# Patient Record
Sex: MALE | Race: WHITE | URBAN - METROPOLITAN AREA
[De-identification: names, ages, dates, MRNs, and addresses within clinical notes are randomized per-mention and may not be internally consistent; named-entity substitution may affect disease eponyms.]

---

## 2019-10-07 ENCOUNTER — OFFICE VISIT (OUTPATIENT)
Dept: URBAN - METROPOLITAN AREA CLINIC 71 | Facility: CLINIC | Age: 74
End: 2019-10-07
Payer: MEDICARE

## 2019-10-07 DIAGNOSIS — H43.811 VITREOUS DEGENERATION, RIGHT EYE: Primary | ICD-10-CM

## 2019-10-07 DIAGNOSIS — H10.45 OTHER CHRONIC ALLERGIC CONJUNCTIVITIS: ICD-10-CM

## 2019-10-07 DIAGNOSIS — H25.13 NUCLEAR SCLEROSIS CATARACT, BILATERAL: ICD-10-CM

## 2019-10-07 DIAGNOSIS — H04.123 DRY EYE SYNDROME OF BILATERAL LACRIMAL GLANDS: ICD-10-CM

## 2019-10-07 PROCEDURE — 99204 OFFICE O/P NEW MOD 45 MIN: CPT | Performed by: OPTOMETRIST

## 2019-10-07 ASSESSMENT — INTRAOCULAR PRESSURE
OS: 13
OD: 14

## 2019-10-07 NOTE — IMPRESSION/PLAN
Impression: Dry eye syndrome of bilateral lacrimal glands: H04.123. carlos feeling OU Plan: Discussed. ATs more frequently. Call if worsens.

## 2019-10-07 NOTE — IMPRESSION/PLAN
Impression: Vitreous degeneration, right eye: H43.811. PVD Plan: Posterior vitreous detachment accounts for the patient's complaints. There is no evidence of associated retinal pathology. All signs and risks of retinal detachment and tears were discussed. Patient instructed to call the office immediately if any symptoms noted.

## 2019-10-07 NOTE — IMPRESSION/PLAN
Impression: Primary open-angle glaucoma, bilateral, indeterminate stage: H40.1134. Dr. Cherie Kwok started latanoprost. High teens before treatment. Now mid-low IOPs. Plan: Reassured that pressures are nicely controlled. Continue latanoprost QHS OU. Continue glc care with Dr. Cherie Kwok.

## 2019-10-07 NOTE — IMPRESSION/PLAN
Impression: Other chronic allergic conjunctivitis: H10.45. occasional eyelid itching Plan: A. Discussed. Advise the use of Ketotifen BID OU. Call if symptoms do not resolve or if worsens.

## 2021-01-22 ENCOUNTER — OFFICE VISIT (OUTPATIENT)
Dept: URBAN - METROPOLITAN AREA CLINIC 71 | Facility: CLINIC | Age: 76
End: 2021-01-22
Payer: MEDICARE

## 2021-01-22 DIAGNOSIS — H40.1134 PRIMARY OPEN-ANGLE GLAUCOMA, BILATERAL, INDETERMINATE STAGE: ICD-10-CM

## 2021-01-22 PROCEDURE — 92014 COMPRE OPH EXAM EST PT 1/>: CPT | Performed by: OPTOMETRIST

## 2021-01-22 ASSESSMENT — INTRAOCULAR PRESSURE
OS: 16
OD: 16

## 2021-01-22 NOTE — IMPRESSION/PLAN
Impression: Vitreous degeneration, right eye: H43.811.  PVD Plan: Continue to observe with yearly DE

## 2021-01-22 NOTE — IMPRESSION/PLAN
Impression: Primary open-angle glaucoma, bilateral, indeterminate stage: H40.1134. Dr. Ursula Ireland started latanoprost. High teens before treatment. Now mid-low IOPs. +FEH of Glaucoma. Plan: Reassured that pressures are nicely controlled. Continue latanoprost QHS OU. Continue glc care with Dr. Ursula Ireland.

## 2022-01-24 ENCOUNTER — OFFICE VISIT (OUTPATIENT)
Dept: URBAN - METROPOLITAN AREA CLINIC 71 | Facility: CLINIC | Age: 77
End: 2022-01-24
Payer: COMMERCIAL

## 2022-01-24 DIAGNOSIS — H52.4 PRESBYOPIA: Primary | ICD-10-CM

## 2022-01-24 PROCEDURE — 92014 COMPRE OPH EXAM EST PT 1/>: CPT | Performed by: OPTOMETRIST

## 2022-01-24 RX ORDER — LATANOPROST 50 UG/ML
0.005 % SOLUTION OPHTHALMIC
Qty: 7.5 | Refills: 1 | Status: ACTIVE
Start: 2022-01-24

## 2022-01-24 ASSESSMENT — INTRAOCULAR PRESSURE
OS: 11
OD: 10

## 2022-01-24 ASSESSMENT — KERATOMETRY
OD: 45.25
OS: 45.25

## 2022-01-24 ASSESSMENT — VISUAL ACUITY
OS: 20/20
OD: 20/20

## 2022-01-24 NOTE — IMPRESSION/PLAN
Impression: Presbyopia: H52.4. Plan: A glasses prescription has been discussed and generated. Longer working distance, per pt. Patient to call with any concerns.

## 2022-01-24 NOTE — IMPRESSION/PLAN
Impression: Vitreous degeneration, right eye: H43.811.  PVD, stable OD Plan: Continue to observe with yearly DE

## 2022-01-24 NOTE — IMPRESSION/PLAN
Impression: Primary open-angle glaucoma, bilateral, indeterminate stage: H40.1134. Dr. Parth Herzog started latanoprost. Pt no longer being monitored by Dr. Norman Davenport for his Glaucoma. Was being monitored every 6 months. High teens before treatment. Now mid-low IOPs. +FEH of Glaucoma. Plan: Reassured that pressures are nicely controlled. Continue latanoprost QHS OU. Obtain updated Glaucoma testing in 2-3 months.

## 2022-03-25 ENCOUNTER — OFFICE VISIT (OUTPATIENT)
Dept: URBAN - METROPOLITAN AREA CLINIC 71 | Facility: CLINIC | Age: 77
End: 2022-03-25
Payer: COMMERCIAL

## 2022-03-25 DIAGNOSIS — H25.813 COMBINED FORMS OF AGE-RELATED CATARACT, BILATERAL: ICD-10-CM

## 2022-03-25 DIAGNOSIS — H40.1131 PRIMARY OPEN-ANGLE GLAUCOMA, BILATERAL, MILD STAGE: Primary | ICD-10-CM

## 2022-03-25 PROCEDURE — 92083 EXTENDED VISUAL FIELD XM: CPT | Performed by: OPTOMETRIST

## 2022-03-25 PROCEDURE — 99213 OFFICE O/P EST LOW 20 MIN: CPT | Performed by: OPTOMETRIST

## 2022-03-25 PROCEDURE — 92133 CPTRZD OPH DX IMG PST SGM ON: CPT | Performed by: OPTOMETRIST

## 2022-03-25 PROCEDURE — 76514 ECHO EXAM OF EYE THICKNESS: CPT | Performed by: OPTOMETRIST

## 2022-03-25 ASSESSMENT — INTRAOCULAR PRESSURE
OS: 15
OD: 13

## 2022-03-25 NOTE — IMPRESSION/PLAN
Impression: Primary open-angle glaucoma, bilateral, mild stage: H40.1131. Maybe moderate, but need to recheck VF. Dr. Zheng Hill started latanoprost. Pt no longer being monitored by Dr. Scout Rincon for his Glaucoma. Was being monitored every 6 months. High teens before treatment. Now mid-low IOPs. +FEH of Glaucoma. Pachs:515/491. Baseline VF 03/25/22: WNL OU, but questionable reliability due to FPs. Baseline OCT 03/25/22: thinning OD>OS. Plan: Continue Latanoprost QHS OU. Repeat VF testing with IOP check in 3 months. Will continue to monitor pressures in 3 month intervals at this time.

## 2022-04-04 ENCOUNTER — OFFICE VISIT (OUTPATIENT)
Dept: URBAN - METROPOLITAN AREA CLINIC 71 | Facility: CLINIC | Age: 77
End: 2022-04-04
Payer: COMMERCIAL

## 2022-04-04 PROCEDURE — 99212 OFFICE O/P EST SF 10 MIN: CPT | Performed by: OPTOMETRIST

## 2022-04-04 ASSESSMENT — INTRAOCULAR PRESSURE
OS: 11
OS: 12
OD: 11

## 2022-04-04 NOTE — IMPRESSION/PLAN
Impression: Dry eye syndrome of bilateral lacrimal glands: H04.123. possible cause of sensation of pressure OD. Plan: Discussed dry eye disease. Use artificial tears up to QID. Gave pt sample of Retaine. Advised pt to use lubricant before symptoms noticed to help prevent symptoms from becoming bothersome. Call if worsens or no improvement.

## 2022-04-04 NOTE — IMPRESSION/PLAN
Impression: Combined forms of age-related cataract, bilateral: H25.813. Pt was not dilated today. Plan: Continue to monitor. Has appt scheduled for DE 01/2023.

## 2022-06-24 ENCOUNTER — OFFICE VISIT (OUTPATIENT)
Dept: URBAN - METROPOLITAN AREA CLINIC 71 | Facility: CLINIC | Age: 77
End: 2022-06-24
Payer: COMMERCIAL

## 2022-06-24 DIAGNOSIS — H04.123 DRY EYE SYNDROME OF BILATERAL LACRIMAL GLANDS: ICD-10-CM

## 2022-06-24 DIAGNOSIS — H25.813 COMBINED FORMS OF AGE-RELATED CATARACT, BILATERAL: ICD-10-CM

## 2022-06-24 DIAGNOSIS — H40.1131 PRIMARY OPEN-ANGLE GLAUCOMA, BILATERAL, MILD STAGE: Primary | ICD-10-CM

## 2022-06-24 PROCEDURE — 99213 OFFICE O/P EST LOW 20 MIN: CPT | Performed by: OPTOMETRIST

## 2022-06-24 PROCEDURE — 92083 EXTENDED VISUAL FIELD XM: CPT | Performed by: OPTOMETRIST

## 2022-06-24 ASSESSMENT — INTRAOCULAR PRESSURE
OD: 13
OS: 12

## 2022-06-24 NOTE — IMPRESSION/PLAN
Impression: Dry eye syndrome of bilateral lacrimal glands: H04.123. Still possible cause of sensation of pressure OD. Plan: Discussed dry eye disease again. Continue to use artificial tears up to QID. Advised pt to use lubricant before symptoms noticed to help prevent symptoms from becoming bothersome. Call if worsens or no improvement.

## 2022-06-24 NOTE — IMPRESSION/PLAN
Impression: Primary open-angle glaucoma, bilateral, mild stage: H40.1131. Maybe moderate, but need to recheck VF. Dr. Erich Stanley started latanoprost. Pt no longer being monitored by Dr. Jen Farrell for his Glaucoma. Was being monitored every 6 months. High teens before treatment. Target: mid teens OU. +FEH of Glaucoma. Pachs:515/491. VF 06/24/22: possible inf step OD, possible sup step OS. Baseline OCT 03/25/22: thinning OD>OS. Plan: Continue Latanoprost QHS OU. Will continue to monitor pressures in 3 month intervals at this time. Next VF due 06/2023.

## 2022-06-24 NOTE — IMPRESSION/PLAN
Impression: Combined forms of age-related cataract, bilateral: H25.813. Pt was not dilated today. Plan: Continue to monitor. Keep appt as scheduled for DE 01/2023.

## 2022-09-26 ENCOUNTER — OFFICE VISIT (OUTPATIENT)
Dept: URBAN - METROPOLITAN AREA CLINIC 71 | Facility: CLINIC | Age: 77
End: 2022-09-26
Payer: COMMERCIAL

## 2022-09-26 DIAGNOSIS — H40.1131 PRIMARY OPEN-ANGLE GLAUCOMA, BILATERAL, MILD STAGE: Primary | ICD-10-CM

## 2022-09-26 DIAGNOSIS — H25.813 COMBINED FORMS OF AGE-RELATED CATARACT, BILATERAL: ICD-10-CM

## 2022-09-26 PROCEDURE — 99213 OFFICE O/P EST LOW 20 MIN: CPT | Performed by: OPTOMETRIST

## 2022-09-26 ASSESSMENT — INTRAOCULAR PRESSURE
OS: 12
OD: 12

## 2022-09-26 NOTE — IMPRESSION/PLAN
Impression: Primary open-angle glaucoma, bilateral, mild stage: H40.1131. Maybe moderate, but need to recheck VF. Dr. John Guido started latanoprost. Pt no longer being monitored by Dr. Ortega Wheeler for his Glaucoma. Was being monitored every 6 months. High teens before treatment. Target: mid teens OU. IOP good today OU. +FEH of Glaucoma. Pachs:515/491. VF 06/24/22: possible inf step OD, possible sup step OS. Baseline OCT 03/25/22: thinning OD>OS. Plan: Continue Latanoprost QHS OU. Will continue to monitor pressures in 3 month intervals at this time. Next VF due 06/2023.

## 2023-01-27 ENCOUNTER — OFFICE VISIT (OUTPATIENT)
Dept: URBAN - METROPOLITAN AREA CLINIC 71 | Facility: CLINIC | Age: 78
End: 2023-01-27
Payer: COMMERCIAL

## 2023-01-27 DIAGNOSIS — H40.1131 PRIMARY OPEN-ANGLE GLAUCOMA, BILATERAL, MILD STAGE: ICD-10-CM

## 2023-01-27 DIAGNOSIS — H25.813 COMBINED FORMS OF AGE-RELATED CATARACT, BILATERAL: ICD-10-CM

## 2023-01-27 DIAGNOSIS — H52.4 PRESBYOPIA: Primary | ICD-10-CM

## 2023-01-27 DIAGNOSIS — H43.811 VITREOUS DEGENERATION, RIGHT EYE: ICD-10-CM

## 2023-01-27 PROCEDURE — 92014 COMPRE OPH EXAM EST PT 1/>: CPT | Performed by: OPTOMETRIST

## 2023-01-27 ASSESSMENT — KERATOMETRY
OS: 45.00
OD: 45.50

## 2023-01-27 ASSESSMENT — INTRAOCULAR PRESSURE
OD: 10
OS: 10

## 2023-01-27 ASSESSMENT — VISUAL ACUITY
OD: 20/20
OS: 20/20

## 2023-01-27 NOTE — IMPRESSION/PLAN
Impression: Primary open-angle glaucoma, bilateral, mild stage: H40.1131. Maybe moderate, but need to recheck VF. Dr. Harika Vivas started latanoprost. Pt no longer being monitored by Dr. Magaly Jenkins for his Glaucoma. Was being monitored every 6 months. High teens before treatment. Target: mid teens OU. IOP still good today OU. +FEH of Glaucoma. Pachs:515/491. VF 06/24/22: possible inf step OD, possible sup step OS. Baseline OCT 03/25/22: thinning OD>OS. Plan: Continue Latanoprost QHS OU. Will continue to monitor pressures in 3 month intervals at this time. Next VF due 06/2023.

## 2023-01-27 NOTE — IMPRESSION/PLAN
Impression: Combined forms of age-related cataract, bilateral: H25.813. Plan: Cataracts may be affecting vision some, but no surgery is currently recommended. The patient will monitor vision changes and contact us with any decrease in vision. Continue to monitor.

## 2023-04-28 ENCOUNTER — OFFICE VISIT (OUTPATIENT)
Dept: URBAN - METROPOLITAN AREA CLINIC 71 | Facility: CLINIC | Age: 78
End: 2023-04-28
Payer: COMMERCIAL

## 2023-04-28 DIAGNOSIS — H40.1131 PRIMARY OPEN-ANGLE GLAUCOMA, BILATERAL, MILD STAGE: Primary | ICD-10-CM

## 2023-04-28 DIAGNOSIS — H25.813 COMBINED FORMS OF AGE-RELATED CATARACT, BILATERAL: ICD-10-CM

## 2023-04-28 DIAGNOSIS — H04.123 DRY EYE SYNDROME OF BILATERAL LACRIMAL GLANDS: ICD-10-CM

## 2023-04-28 PROCEDURE — 92133 CPTRZD OPH DX IMG PST SGM ON: CPT | Performed by: OPTOMETRIST

## 2023-04-28 PROCEDURE — 99213 OFFICE O/P EST LOW 20 MIN: CPT | Performed by: OPTOMETRIST

## 2023-04-28 ASSESSMENT — INTRAOCULAR PRESSURE
OD: 10
OS: 10
OS: 12
OD: 11

## 2023-04-28 ASSESSMENT — KERATOMETRY
OS: 45.13
OD: 45.50

## 2023-04-28 NOTE — IMPRESSION/PLAN
Impression: Dry eye syndrome of bilateral lacrimal glands: H04.123. Using lubricating drops. Plan: Continue to use artificial tears up to QID. Still recommend pt to use lubricant before symptoms noticed to help prevent symptoms from becoming bothersome. Call if worsens or no improvement.

## 2023-04-28 NOTE — IMPRESSION/PLAN
Impression: Primary open-angle glaucoma, bilateral, mild stage: H40.1131. Maybe moderate, but need to recheck VF. Dr. Sierra Naqvi started latanoprost. Pt no longer being monitored by Dr. Nik Muir for his Glaucoma. Was being monitored every 6 months. High teens before treatment. Target: mid teens OU. IOP still good today OU. +FEH of Glaucoma. Pachs:515/491. VF 06/24/22: possible inf step OD, possible sup step OS. OCT 04/28/23: thinning stable OD, maybe worse OS. Plan: Discussed. Continue Latanoprost QHS OU. Will continue to monitor pressures in 3 month intervals at this time. Next OCT due 04/2024. Will obtain VF with next IOP in 3 months. If everything stable at that visit, okay to switch to monitoring every 4 months.

## 2023-08-10 ENCOUNTER — OFFICE VISIT (OUTPATIENT)
Dept: URBAN - METROPOLITAN AREA CLINIC 71 | Facility: CLINIC | Age: 78
End: 2023-08-10
Payer: COMMERCIAL

## 2023-08-10 DIAGNOSIS — H40.1131 PRIMARY OPEN-ANGLE GLAUCOMA, BILATERAL, MILD STAGE: Primary | ICD-10-CM

## 2023-08-10 DIAGNOSIS — H25.813 COMBINED FORMS OF AGE-RELATED CATARACT, BILATERAL: ICD-10-CM

## 2023-08-10 PROCEDURE — 92083 EXTENDED VISUAL FIELD XM: CPT | Performed by: OPTOMETRIST

## 2023-08-10 PROCEDURE — 99213 OFFICE O/P EST LOW 20 MIN: CPT | Performed by: OPTOMETRIST

## 2023-08-10 ASSESSMENT — INTRAOCULAR PRESSURE
OD: 12
OS: 13
OD: 14
OS: 15